# Patient Record
Sex: FEMALE | Race: WHITE | NOT HISPANIC OR LATINO | URBAN - NONMETROPOLITAN AREA
[De-identification: names, ages, dates, MRNs, and addresses within clinical notes are randomized per-mention and may not be internally consistent; named-entity substitution may affect disease eponyms.]

---

## 2019-08-02 ENCOUNTER — OFFICE VISIT (OUTPATIENT)
Dept: PSYCHIATRY | Facility: CLINIC | Age: 34
End: 2019-08-02

## 2019-08-02 DIAGNOSIS — F43.23 ADJUSTMENT DISORDER WITH MIXED ANXIETY AND DEPRESSED MOOD: Primary | ICD-10-CM

## 2019-08-02 PROCEDURE — 90791 PSYCH DIAGNOSTIC EVALUATION: CPT | Performed by: COUNSELOR

## 2019-08-02 NOTE — PROGRESS NOTES
PROGRESS NOTE  Data:  Luz Covarrubias came in 2019 for her regularly scheduled therapy session starting at 8:00 am and ending at 9:15 am, with Ángela Tilley [facility].     (Scales based on 0 - 10 with 10 being the worst)  Depression: {NUMBERS 0-10:06683} Anxiety: {NUMBERS 0-10:29641}     HPI:       Clinical Maneuvering/Intervention:  Assisted patient in processing above session content; acknowledged and normalized patient’s thoughts, feelings, and concerns.     Allowed patient to freely discuss issues without interruption or judgment. Provided safe, confidential environment to facilitate the development of positive therapeutic relationship and encourage open, honest communication. Assisted patient in identifying risk factors which would indicate the need for higher level of care including thoughts to harm self or others and/or self-harming behavior and encouraged patient to contact this office, call 911, or present to the nearest emergency room should any of these events occur. Discussed crisis intervention services and means to access.  Patient adamantly and convincingly denies current suicidal or homicidal ideation or perceptual disturbance.        Assessment     ***    Diagnosis: No diagnosis found.    No primary diagnosis found.    Mental Status Exam  Hygiene:  {GOOD FAIR POOR ENDO NH:02866}  Dress:  {kevin's dress:96686}  Attitude:  {Cooperation:965347948}  Motor Activity:  {Psychomotor Behavior:16441}  Speech:  {Speech:817575668}  Mood:  {MOOD:02818}  Affect:  {AFFECTS:80209}  Thought Processes:  {Thought Progess:144108305}  Thought Content:  {Exam; psych thought content:02485}  Suicidal Thoughts:  {has/denies:615419}  Homicidal Thoughts:  {has/denies:250728}  Crisis Safety Plan: yes, to come to the emergency room.  Hallucinations:  {Actions; denies/admits to:5300}          Patient's Support Network Includes:  {family members:}    Progress toward goal: {tnpt}    Functional Status:  {rsfunctionalstatus:18649}    Prognosis: {tnprognosis:94787}      Plan         Patient will adhere to medication regimen as prescribed and report any side effects. Patient will contact this office, call 911 or present to the nearest emergency room should suicidal or homicidal ideations occur. Provide Cognitive Behavioral Therapy and Integrative Therapy to improve functioning, maintain stability, and avoid decompensation and the need for higher level of care.            No Follow-up on file.            This document electronically signed by Ángela Tilley, CAYLA, NCC, CSAT  August 2, 2019 9:30 AM    Plan
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
## 2019-10-08 ENCOUNTER — OFFICE VISIT (OUTPATIENT)
Dept: PSYCHIATRY | Facility: CLINIC | Age: 34
End: 2019-10-08

## 2019-10-08 DIAGNOSIS — F43.23 ADJUSTMENT DISORDER WITH MIXED ANXIETY AND DEPRESSED MOOD: Primary | ICD-10-CM

## 2019-10-08 PROCEDURE — 90837 PSYTX W PT 60 MINUTES: CPT | Performed by: COUNSELOR

## 2019-10-08 NOTE — PROGRESS NOTES
"    PROGRESS NOTE  Data:  Luz Silverman came in 10/9/2019 for her regularly scheduled therapy session starting at 2:15 pm and ending at 3:15 pm, with Ángela Tilley [facility]. \"I've been really well.\"     (Scales based on 0 - 10 with 10 being the worst)  Depression: 0 Anxiety: 0     HPI:   Patient was pleased \"to share my progression since I was here.\"  Patient has been reading Timo Vora' book \"Feeling Good\" and becoming more aware of irrational thinking patterns and restating negative messages. From the last session she learned that most problems are due to relationship issues.  \"I realized that some of my issues were about my . He is good if I avoid issues but if I call him on it he can acknowledged and will stop up and talk.\"  \"I've become more conscious of my attachment and recognize the need to be with others as well.\"  Patient went with her 25 y/o step daughter to a wellness weekend that focused on meditation, letting go, not judging self and \"accepting as the way out.\"  \"It was wonderful for both of us.\"  \"I recognized how easily it has been for me to spiral.  I felt empowered, found more self-worth, sticking up more for myself.  I've learned to assert myself where previously I had to push myself.  I already was on to the fact I can't manipulate the external.  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  \"I recognize that I can't control my environment but only my response to it.  Before I felt like work was sucking life out of me.\"  Patient rated the quality of her connection with her  now as 8/10 improved my more quality time alone together.  Since April they have only gotten away alone for one weekend in August. \"Previously we felt so disconnected.\"  Her  may soon be able to work from home.  " "Patient recalled how she and her sister had always felt they were \"an imposition in our environment.\"  \"My  has been good at validating and I've been helping him where he struggled.\"    Clinical Maneuvering/Intervention:  Assisted patient in processing above session content; acknowledged, normalized and validated patient’s thoughts, feelings, and concerns.  Patient was affirmed by her progress and encouraged to continue practicing her improved coping skills.    Allowed patient to freely discuss issues without interruption or judgment. Provided safe, confidential environment to facilitate the development of positive therapeutic relationship and encourage open, honest communication. Assisted patient in identifying risk factors which would indicate the need for higher level of care including thoughts to harm self or others and/or self-harming behavior and encouraged patient to contact this office, call 911, or present to the nearest emergency room should any of these events occur. Discussed crisis intervention services and means to access.  Patient adamantly and convincingly denies current suicidal or homicidal ideation or perceptual disturbance.        Assessment     Patient is stable and enjoying progress in her mental health and relationships.    Diagnosis:   Encounter Diagnosis   Name Primary?   • Adjustment disorder with mixed anxiety and depressed mood Yes       Adjustment disorder with mixed anxiety and depressed mood [F43.23]    Mental Status Exam  Hygiene:  good  Dress:  casual  Attitude:  Cooperative  Motor Activity:  Appropriate  Speech:  Normal  Mood:  within normal limits  Affect:  calm and pleasant  Thought Processes:  Goal directed  Thought Content:  normal  Suicidal Thoughts:  denies  Homicidal Thoughts:  denies  Crisis Safety Plan: yes, to come to the emergency room.  Hallucinations:  denies          Patient's Support Network Includes:  , son and extended family and friends    Progress " toward goal: At goal    Functional Status: No impairment    Prognosis: Good with Ongoing Treatment       Plan         Patient will adhere to medication regimen as prescribed and report any side effects. Patient will contact this office, call 911 or present to the nearest emergency room should suicidal or homicidal ideations occur. Provide Cognitive Behavioral Therapy and Integrative Therapy to improve functioning, maintain stability, and avoid decompensation and the need for higher level of care.            Return in about 4 weeks (around 11/5/2019).            This document electronically signed by Ángela Tilley, CAYLA, NCC, CSAT  October 9, 2019 3:25 PM    Plan

## 2020-01-08 ENCOUNTER — TRANSCRIBE ORDERS (OUTPATIENT)
Dept: ADMINISTRATIVE | Facility: HOSPITAL | Age: 35
End: 2020-01-08

## 2020-01-08 ENCOUNTER — LAB (OUTPATIENT)
Dept: LAB | Facility: HOSPITAL | Age: 35
End: 2020-01-08

## 2020-01-08 DIAGNOSIS — T80.51XS: ICD-10-CM

## 2020-01-08 DIAGNOSIS — T80.51XS: Primary | ICD-10-CM

## 2020-01-08 PROCEDURE — 86003 ALLG SPEC IGE CRUDE XTRC EA: CPT

## 2020-01-08 PROCEDURE — 36415 COLL VENOUS BLD VENIPUNCTURE: CPT

## 2020-01-10 LAB
CODFISH IGE QN: <0.1 KU/L
CONV CLASS DESCRIPTION: NORMAL
HALIBUT IGE QN: <0.1 KU/L
MACKEREL IGE QN: <0.1 KU/L
SALMON IGE QN: <0.1 KU/L
TROUT IGE QN: <0.1 KU/L
TUNA IGE QN: <0.1 KU/L
WALLEYE PIKE IGE QN: <0.1 KU/L

## 2020-01-14 ENCOUNTER — APPOINTMENT (OUTPATIENT)
Dept: LAB | Facility: HOSPITAL | Age: 35
End: 2020-01-14

## 2020-01-14 ENCOUNTER — TRANSCRIBE ORDERS (OUTPATIENT)
Dept: ADMINISTRATIVE | Facility: HOSPITAL | Age: 35
End: 2020-01-14

## 2020-01-14 DIAGNOSIS — Z01.89 PATIENT REQUEST FOR DIAGNOSTIC TESTING: Primary | ICD-10-CM

## 2020-01-14 PROCEDURE — 86003 ALLG SPEC IGE CRUDE XTRC EA: CPT | Performed by: NURSE PRACTITIONER

## 2020-01-14 PROCEDURE — 36415 COLL VENOUS BLD VENIPUNCTURE: CPT | Performed by: NURSE PRACTITIONER

## 2020-01-18 LAB
BLUE MUSSEL IGE QN: <0.1 KU/L
CLAM IGE QN: <0.1 KU/L
CONV CLASS DESCRIPTION: ABNORMAL
CRAB IGE QN: <0.1 KU/L
LOBSTER IGE QN: <0.1 KU/L
OYSTER IGE QN: <0.1 KU/L
SCALLOP IGE QN: <0.1 KU/L
SHRIMP IGE: 0.95 KU/L

## 2020-08-03 ENCOUNTER — OFFICE VISIT (OUTPATIENT)
Dept: FAMILY MEDICINE CLINIC | Facility: CLINIC | Age: 35
End: 2020-08-03

## 2020-08-03 VITALS
HEART RATE: 88 BPM | SYSTOLIC BLOOD PRESSURE: 118 MMHG | WEIGHT: 151 LBS | TEMPERATURE: 98.4 F | DIASTOLIC BLOOD PRESSURE: 76 MMHG | HEIGHT: 66 IN | OXYGEN SATURATION: 99 % | BODY MASS INDEX: 24.27 KG/M2

## 2020-08-03 DIAGNOSIS — K58.0 IRRITABLE BOWEL SYNDROME WITH DIARRHEA: ICD-10-CM

## 2020-08-03 DIAGNOSIS — R53.83 FATIGUE, UNSPECIFIED TYPE: ICD-10-CM

## 2020-08-03 DIAGNOSIS — K90.41 GLUTEN INTOLERANCE: ICD-10-CM

## 2020-08-03 DIAGNOSIS — F33.1 MODERATE EPISODE OF RECURRENT MAJOR DEPRESSIVE DISORDER (HCC): Primary | ICD-10-CM

## 2020-08-03 PROCEDURE — 86431 RHEUMATOID FACTOR QUANT: CPT | Performed by: FAMILY MEDICINE

## 2020-08-03 PROCEDURE — 84443 ASSAY THYROID STIM HORMONE: CPT | Performed by: FAMILY MEDICINE

## 2020-08-03 PROCEDURE — 99203 OFFICE O/P NEW LOW 30 MIN: CPT | Performed by: FAMILY MEDICINE

## 2020-08-03 PROCEDURE — 85652 RBC SED RATE AUTOMATED: CPT | Performed by: FAMILY MEDICINE

## 2020-08-03 PROCEDURE — 82728 ASSAY OF FERRITIN: CPT | Performed by: FAMILY MEDICINE

## 2020-08-03 PROCEDURE — 82306 VITAMIN D 25 HYDROXY: CPT | Performed by: FAMILY MEDICINE

## 2020-08-03 PROCEDURE — 86038 ANTINUCLEAR ANTIBODIES: CPT | Performed by: FAMILY MEDICINE

## 2020-08-03 PROCEDURE — 86200 CCP ANTIBODY: CPT | Performed by: FAMILY MEDICINE

## 2020-08-03 PROCEDURE — 82746 ASSAY OF FOLIC ACID SERUM: CPT | Performed by: FAMILY MEDICINE

## 2020-08-03 PROCEDURE — 80053 COMPREHEN METABOLIC PANEL: CPT | Performed by: FAMILY MEDICINE

## 2020-08-03 PROCEDURE — 83540 ASSAY OF IRON: CPT | Performed by: FAMILY MEDICINE

## 2020-08-03 PROCEDURE — 84466 ASSAY OF TRANSFERRIN: CPT | Performed by: FAMILY MEDICINE

## 2020-08-03 PROCEDURE — 83735 ASSAY OF MAGNESIUM: CPT | Performed by: FAMILY MEDICINE

## 2020-08-03 PROCEDURE — 85025 COMPLETE CBC W/AUTO DIFF WBC: CPT | Performed by: FAMILY MEDICINE

## 2020-08-03 PROCEDURE — 82607 VITAMIN B-12: CPT | Performed by: FAMILY MEDICINE

## 2020-08-03 RX ORDER — IPRATROPIUM BROMIDE 21 UG/1
2 SPRAY, METERED NASAL EVERY 12 HOURS
COMMUNITY
End: 2020-08-31 | Stop reason: SDUPTHER

## 2020-08-03 RX ORDER — BUPROPION HYDROCHLORIDE 150 MG/1
150 TABLET ORAL EVERY MORNING
Qty: 30 TABLET | Refills: 1 | Status: SHIPPED | OUTPATIENT
Start: 2020-08-03 | End: 2020-08-31 | Stop reason: SDUPTHER

## 2020-08-03 NOTE — PROGRESS NOTES
Subjective   Luz Silverman is a 35 y.o. female.   Pt presents today with CC of Irritable Bowel Syndrome      History of Present Illness   Patient is a pleasant 35-year-old female here to establish care.  She has a 13-year-old son.  She works full-time at [employer].  #2 she has a history of suicide in her family x2.  She had depression last fall for which she saw counselor.  She did not have a great relationship with this counselor and requests a new counselor.  She has never been on medications, reportedly.  She is interested in starting a medication.  She reports that she is sleeping well, she is lost a little interest in things she likes to do, she is not guilty about anything though does admit that her family members' suicides have taken a toll on her.  She also reports marital concerns.  She reports good energy, she tries to exercise regularly.  Her appetite has been normal.  She denies suicidal or homicidal ideation.  #3 she complains of chronic fatigue.  She would like laboratory work-up for this.  She is concerned about autoimmune causes, she would like screening for this.  #4 she was diagnosed with IBS with diarrhea in the past by gastroenterology.  She requests a second opinion.  Also, she has been told she has a gluten intolerance.  Historically, when she eats gluten she does become ill.       The following portions of the patient's history were reviewed and updated as appropriate: allergies, current medications, past family history, past medical history, past social history, past surgical history and problem list.    Review of Systems   Constitutional: Negative for chills, fever and unexpected weight loss.   HENT: Negative for congestion and sore throat.    Eyes: Negative for blurred vision and visual disturbance.   Respiratory: Negative for cough and wheezing.    Cardiovascular: Negative for chest pain and palpitations.   Gastrointestinal: Negative for abdominal pain and diarrhea.   Genitourinary:  Negative for dysuria.   Musculoskeletal: Negative for arthralgias and neck stiffness.   Skin: Negative for rash.   Neurological: Negative for dizziness, seizures and syncope.   Psychiatric/Behavioral: Negative for self-injury, suicidal ideas and depressed mood.       Objective   Physical Exam   Constitutional: She is oriented to person, place, and time. She appears well-developed and well-nourished.   HENT:   Head: Normocephalic and atraumatic.   Right Ear: External ear normal.   Left Ear: External ear normal.   Nose: Nose normal.   Mouth/Throat: Oropharynx is clear and moist.   Eyes: Pupils are equal, round, and reactive to light. Conjunctivae and EOM are normal.   Neck: Normal range of motion. Neck supple.   Cardiovascular: Normal rate, regular rhythm and normal heart sounds.   Pulmonary/Chest: Effort normal and breath sounds normal.   Abdominal: Soft. Bowel sounds are normal.   Neurological: She is alert and oriented to person, place, and time.   Skin: Skin is warm and dry.   Nursing note and vitals reviewed.        Assessment/Plan   Luz was seen today for irritable bowel syndrome.    Diagnoses and all orders for this visit:    Moderate episode of recurrent major depressive disorder (CMS/HCC)  -     buPROPion XL (Wellbutrin XL) 150 MG 24 hr tablet; Take 1 tablet by mouth Every Morning.  -     Psychotherapy; Future  We will refer her to another therapist, we discussed medication options.  Because of common GI side effects of SSRIs, we will start Wellbutrin.  The side effects of this medication were discussed and she was agreeable to proceed.  If she develops suicidal or homicidal ideation, she was encouraged to go to the emergency room.  Fatigue, unspecified type  -     CBC Auto Differential; Future  -     Comprehensive Metabolic Panel; Future  -     Sedimentation Rate; Future  -     Cyclic Citrul Peptide Antibody, IgG / IgA; Future  -     Ferritin; Future  -     Folate; Future  -     Iron Profile; Future  -      Magnesium; Future  -     Vitamin D 25 Hydroxy; Future  -     Vitamin B12; Future  -     TSH; Future  -     Rheumatoid Factor; Future  -     TODD; Future  -     CBC Auto Differential  -     Comprehensive Metabolic Panel  -     Sedimentation Rate  -     Cyclic Citrul Peptide Antibody, IgG / IgA  -     Ferritin  -     Folate  -     Iron Profile  -     Magnesium  -     Vitamin D 25 Hydroxy  -     Vitamin B12  -     TSH  -     Rheumatoid Factor  -     TODD  We will get labs to look into reasons for fatigue.  I suspect that most of her fatigue is from depression associated with being a mother and wife along with working full-time.  I encouraged her to try and exercise regularly.  She is agreeable.  Irritable bowel syndrome with diarrhea  -     Ambulatory Referral to Gastroenterology    Gluten intolerance  -     Ambulatory Referral to Gastroenterology                    Patient's Body mass index is 24.37 kg/m². BMI is above normal parameters. Recommendations include: exercise counseling and nutrition counseling.

## 2020-08-04 LAB
25(OH)D3 SERPL-MCNC: 42.4 NG/ML (ref 30–100)
ALBUMIN SERPL-MCNC: 4.4 G/DL (ref 3.5–5.2)
ALBUMIN/GLOB SERPL: 1.5 G/DL
ALP SERPL-CCNC: 45 U/L (ref 39–117)
ALT SERPL W P-5'-P-CCNC: 16 U/L (ref 1–33)
ANION GAP SERPL CALCULATED.3IONS-SCNC: 9.7 MMOL/L (ref 5–15)
AST SERPL-CCNC: 23 U/L (ref 1–32)
BASOPHILS # BLD AUTO: 0.04 10*3/MM3 (ref 0–0.2)
BASOPHILS NFR BLD AUTO: 0.6 % (ref 0–1.5)
BILIRUB SERPL-MCNC: 0.2 MG/DL (ref 0–1.2)
BUN SERPL-MCNC: 10 MG/DL (ref 6–20)
BUN/CREAT SERPL: 15.2 (ref 7–25)
CALCIUM SPEC-SCNC: 9.6 MG/DL (ref 8.6–10.5)
CHLORIDE SERPL-SCNC: 101 MMOL/L (ref 98–107)
CHROMATIN AB SERPL-ACNC: 14.8 IU/ML (ref 0–14)
CO2 SERPL-SCNC: 26.3 MMOL/L (ref 22–29)
CREAT SERPL-MCNC: 0.66 MG/DL (ref 0.57–1)
DEPRECATED RDW RBC AUTO: 41.8 FL (ref 37–54)
EOSINOPHIL # BLD AUTO: 0.09 10*3/MM3 (ref 0–0.4)
EOSINOPHIL NFR BLD AUTO: 1.3 % (ref 0.3–6.2)
ERYTHROCYTE [DISTWIDTH] IN BLOOD BY AUTOMATED COUNT: 12.1 % (ref 12.3–15.4)
ERYTHROCYTE [SEDIMENTATION RATE] IN BLOOD: 7 MM/HR (ref 0–20)
FERRITIN SERPL-MCNC: 50.4 NG/ML (ref 13–150)
FOLATE SERPL-MCNC: 5.79 NG/ML (ref 4.78–24.2)
GFR SERPL CREATININE-BSD FRML MDRD: 101 ML/MIN/1.73
GLOBULIN UR ELPH-MCNC: 3 GM/DL
GLUCOSE SERPL-MCNC: 92 MG/DL (ref 65–99)
HCT VFR BLD AUTO: 41.9 % (ref 34–46.6)
HGB BLD-MCNC: 14.5 G/DL (ref 12–15.9)
IMM GRANULOCYTES # BLD AUTO: 0.02 10*3/MM3 (ref 0–0.05)
IMM GRANULOCYTES NFR BLD AUTO: 0.3 % (ref 0–0.5)
IRON 24H UR-MRATE: 121 MCG/DL (ref 37–145)
IRON SATN MFR SERPL: 29 % (ref 20–50)
LYMPHOCYTES # BLD AUTO: 1.82 10*3/MM3 (ref 0.7–3.1)
LYMPHOCYTES NFR BLD AUTO: 26.5 % (ref 19.6–45.3)
MAGNESIUM SERPL-MCNC: 2.1 MG/DL (ref 1.6–2.6)
MCH RBC QN AUTO: 32.2 PG (ref 26.6–33)
MCHC RBC AUTO-ENTMCNC: 34.6 G/DL (ref 31.5–35.7)
MCV RBC AUTO: 92.9 FL (ref 79–97)
MONOCYTES # BLD AUTO: 0.38 10*3/MM3 (ref 0.1–0.9)
MONOCYTES NFR BLD AUTO: 5.5 % (ref 5–12)
NEUTROPHILS NFR BLD AUTO: 4.53 10*3/MM3 (ref 1.7–7)
NEUTROPHILS NFR BLD AUTO: 65.8 % (ref 42.7–76)
NRBC BLD AUTO-RTO: 0 /100 WBC (ref 0–0.2)
PLATELET # BLD AUTO: 286 10*3/MM3 (ref 140–450)
PMV BLD AUTO: 10.7 FL (ref 6–12)
POTASSIUM SERPL-SCNC: 3.6 MMOL/L (ref 3.5–5.2)
PROT SERPL-MCNC: 7.4 G/DL (ref 6–8.5)
RBC # BLD AUTO: 4.51 10*6/MM3 (ref 3.77–5.28)
SODIUM SERPL-SCNC: 137 MMOL/L (ref 136–145)
TIBC SERPL-MCNC: 419 MCG/DL (ref 298–536)
TRANSFERRIN SERPL-MCNC: 281 MG/DL (ref 200–360)
TSH SERPL DL<=0.05 MIU/L-ACNC: 1.21 UIU/ML (ref 0.27–4.2)
VIT B12 BLD-MCNC: 502 PG/ML (ref 211–946)
WBC # BLD AUTO: 6.88 10*3/MM3 (ref 3.4–10.8)

## 2020-08-05 LAB — ANA SER QL: NEGATIVE

## 2020-08-06 ENCOUNTER — TELEPHONE (OUTPATIENT)
Dept: FAMILY MEDICINE CLINIC | Facility: CLINIC | Age: 35
End: 2020-08-06

## 2020-08-06 DIAGNOSIS — M25.541 ARTHRALGIA OF BOTH HANDS: Primary | ICD-10-CM

## 2020-08-06 DIAGNOSIS — M25.542 ARTHRALGIA OF BOTH HANDS: Primary | ICD-10-CM

## 2020-08-06 LAB — CCP IGA+IGG SERPL IA-ACNC: 4 UNITS (ref 0–19)

## 2020-08-06 NOTE — TELEPHONE ENCOUNTER
----- Message from Adama Yoder DO sent at 8/6/2020  2:43 PM EDT -----  I reviewed your labs.  Everything looks good with the exception of your rheumatoid factor, this is 1 of the multiple labs that we checked and screening for rheumatologic disorders.  It came back slightly abnormal.  The remainder of your labs were normal.  If you would like, we could refer you to rheumatology, or we could consider rechecking at a follow-up along with further lab work.  This one lab alone does not mean that you have rheumatoid arthritis, though it does increase suspicion.      Spoke with patient & she chooses to go ahead & see Rheumatology.

## 2020-08-31 ENCOUNTER — OFFICE VISIT (OUTPATIENT)
Dept: FAMILY MEDICINE CLINIC | Facility: CLINIC | Age: 35
End: 2020-08-31

## 2020-08-31 VITALS
OXYGEN SATURATION: 99 % | TEMPERATURE: 97.1 F | DIASTOLIC BLOOD PRESSURE: 76 MMHG | SYSTOLIC BLOOD PRESSURE: 122 MMHG | BODY MASS INDEX: 24.08 KG/M2 | WEIGHT: 149.8 LBS | HEART RATE: 105 BPM | HEIGHT: 66 IN

## 2020-08-31 DIAGNOSIS — F33.1 MODERATE EPISODE OF RECURRENT MAJOR DEPRESSIVE DISORDER (HCC): ICD-10-CM

## 2020-08-31 PROCEDURE — 99213 OFFICE O/P EST LOW 20 MIN: CPT | Performed by: FAMILY MEDICINE

## 2020-08-31 RX ORDER — IPRATROPIUM BROMIDE 21 UG/1
2 SPRAY, METERED NASAL EVERY 12 HOURS
Qty: 30 ML | Refills: 3 | Status: SHIPPED | OUTPATIENT
Start: 2020-08-31 | End: 2021-03-17

## 2020-08-31 RX ORDER — BUPROPION HYDROCHLORIDE 150 MG/1
150 TABLET ORAL EVERY MORNING
Qty: 90 TABLET | Refills: 3 | Status: SHIPPED | OUTPATIENT
Start: 2020-08-31

## 2020-08-31 NOTE — PROGRESS NOTES
Subjective   Luz Silverman is a 35 y.o. female.   Pt presents today with CC of Depression      History of Present Illness   Patient is a pleasant 35-year-old female here to follow-up on depression.  She was started on Wellbutrin  mg daily.  She reports this medication works well.  She has had no problems.  She has had no foggy headedness.  She denies depression.  She denies any suicidal or homicidal ideation.  No palpitations, chest pain, no other concerns.  She would like to continue this medication same dose.       The following portions of the patient's history were reviewed and updated as appropriate: allergies, current medications, past family history, past medical history, past social history, past surgical history and problem list.    Review of Systems   Constitutional: Negative for chills, fever and unexpected weight loss.   HENT: Negative for congestion and sore throat.    Eyes: Negative for blurred vision and visual disturbance.   Respiratory: Negative for cough and wheezing.    Cardiovascular: Negative for chest pain and palpitations.   Gastrointestinal: Negative for abdominal pain and diarrhea.   Endocrine: Negative for cold intolerance and heat intolerance.   Genitourinary: Negative for dysuria.   Musculoskeletal: Negative for arthralgias and neck stiffness.   Neurological: Negative for dizziness, seizures and syncope.   Psychiatric/Behavioral: Negative for self-injury, suicidal ideas and depressed mood.       Objective   Physical Exam   Constitutional: She appears well-developed and well-nourished.   HENT:   Head: Normocephalic and atraumatic.   Eyes: Conjunctivae are normal.   Neck: Normal range of motion. Neck supple. No thyromegaly present.   Cardiovascular: Normal rate and regular rhythm.   Pulmonary/Chest: Effort normal and breath sounds normal.   Nursing note and vitals reviewed.        Assessment/Plan   Luz was seen today for depression.    Diagnoses and all orders for this  visit:    Moderate episode of recurrent major depressive disorder (CMS/HCC)  -     buPROPion XL (Wellbutrin XL) 150 MG 24 hr tablet; Take 1 tablet by mouth Every Morning.  We will continue with the current dose.  As she has been stable on it, no need for close follow-up.  The side effects of this medication were reviewed and she was agreeable to proceed.  If she has any problems she is can follow-up.  Otherwise, follow-up in 1 year for annual physical.    Other orders  -     ipratropium (ATROVENT) 0.03 % nasal spray; 2 sprays into the nostril(s) as directed by provider Every 12 (Twelve) Hours.      Of note, she will be establishing care with a local women's health clinic.  Her last Pap smear was 2 years ago.           Patient's Body mass index is 24.19 kg/m². BMI is above normal parameters. Recommendations include: exercise counseling and nutrition counseling.

## 2020-09-03 ENCOUNTER — OFFICE VISIT (OUTPATIENT)
Dept: GASTROENTEROLOGY | Facility: CLINIC | Age: 35
End: 2020-09-03

## 2020-09-03 VITALS
HEART RATE: 94 BPM | SYSTOLIC BLOOD PRESSURE: 144 MMHG | WEIGHT: 149 LBS | TEMPERATURE: 97 F | BODY MASS INDEX: 24.06 KG/M2 | DIASTOLIC BLOOD PRESSURE: 91 MMHG

## 2020-09-03 DIAGNOSIS — E53.8 VITAMIN B12 DEFICIENCY: ICD-10-CM

## 2020-09-03 DIAGNOSIS — R10.9 ABDOMINAL PAIN, UNSPECIFIED ABDOMINAL LOCATION: Primary | ICD-10-CM

## 2020-09-03 DIAGNOSIS — R19.7 DIARRHEA, UNSPECIFIED TYPE: ICD-10-CM

## 2020-09-03 PROCEDURE — 99244 OFF/OP CNSLTJ NEW/EST MOD 40: CPT | Performed by: INTERNAL MEDICINE

## 2020-09-03 NOTE — PROGRESS NOTES
PCP:  Adama Yoder,      No referring provider defined for this encounter.    Chief Complaint   Patient presents with   • Irritable Bowel Syndrome     New pt. IBS        HPI   Patient is a 35-year-old female who 13 years ago had a  section.  That is when her problems began.  She noted that at that time she became lactose intolerant.  She also has felt that she has become gluten intolerant.  Her previous serologies have been negative.  She has been on a gluten-free diet for a few years now.  There is no family history of ulcerative colitis or Crohn's disease.  She has significant symptoms with diarrhea and abdominal discomfort if she takes gluten.  Her hemoglobin was 14.5 and hematocrit 41.9.  Her white blood cell count was 6.88 and platelet count 286.  Her liver chemistries were normal.  These were done on 8/3/2020.  She has no family history of colon polyps or cancers.  She interestingly also has B12 deficiency.  She notes some fogginess of her head.  She does get canker sores in her mouth.  She has had a  section,    No Known Allergies       Current Outpatient Medications:   •  buPROPion XL (Wellbutrin XL) 150 MG 24 hr tablet, Take 1 tablet by mouth Every Morning., Disp: 90 tablet, Rfl: 3  •  Cromolyn Sodium (FP NASAL ALLERGY NA), into the nostril(s) as directed by provider Daily., Disp: , Rfl:   •  ipratropium (ATROVENT) 0.03 % nasal spray, 2 sprays into the nostril(s) as directed by provider Every 12 (Twelve) Hours., Disp: 30 mL, Rfl: 3  •  Norethin-Eth Estrad-Fe Biphas (LO LOESTRIN FE PO), Take  by mouth Daily., Disp: , Rfl:      Past Medical History:   Diagnosis Date   • IBS (irritable bowel syndrome)        Past Surgical History:   Procedure Laterality Date   •  SECTION     • MOUTH SURGERY     Breast augmentation  Cervical biopsy    Social History     Socioeconomic History   • Marital status:      Spouse name: Not on file   • Number of children: Not on file   • Years of  education: Not on file   • Highest education level: Not on file   Tobacco Use   • Smoking status:  Non-smoker quit in her late 20s   • Smokeless tobacco: Never Used   Substance and Sexual Activity   • Alcohol use: Yes     Frequency: Monthly or less   • Drug use: Never   • Sexual activity: Yes     Partners: Male     Birth control/protection: Pill        Family History   Problem Relation Age of Onset   • Thyroid disease Mother    • Fibromyalgia Mother    • Depression Mother         Review of Systems   Constitutional: Positive for fatigue.   HENT: Negative.    Eyes: Negative.    Respiratory: Negative.    Gastrointestinal: Positive for abdominal distention and diarrhea.   Endocrine: Negative.    Musculoskeletal: Negative.    Allergic/Immunologic: Negative.    Neurological: Negative.    Hematological: Negative.    Psychiatric/Behavioral: Negative.         Vitals:    09/03/20 1352   BP: 144/91   Pulse: 94   Temp: 97 °F (36.1 °C)        Physical Exam   General Appearance: Alert, in no acute distress   Head: Normocephalic, without obvious abnormality, atraumatic   Eyes: Lids and lashes normal, conjunctivae and sclerae normal, no icterus, no pallor, corneas clear, PERRLA   Ears: Ears appear intact with no abnormalities noted   Neck: No adenopathy, supple, trachea midline, no thyromegaly, no JVD   Lungs: respirations regular, even and unlabored Heart: Regular rhythm and normal rate   Chest Wall: Symmetrical respiratory expansion   Abdomen: No masses, no organomegaly, soft non-tender, non-distended, no guarding, no rebound tenderness   Extremities: Moves all extremities well, no edema, no cyanosis, no redness   Skin: No bleeding, bruising or rash   Neurologic: Cranial nerves 2 - 12 grossly intact, no focal deficits     Review of systems was reviewed and positives are noted. All of the remaining review of systems in that system are negative.    Luz was seen today for irritable bowel syndrome.    Diagnoses and all orders for  this visit:    Abdominal pain, unspecified abdominal location  -     Celiac Comprehensive Panel    Diarrhea, unspecified type    Vitamin B12 deficiency    Impressions and plan #1 abdominal discomfort and diarrhea: She is wondering if she has celiac disease.  Her previous serologies were negative but I do not have those results.  It is unclear whether she had a comprehensive celiac panel or not.  She has been on a fairly strict gluten-free diet for the past few years.  80 to 90% of patients that have been on a strict gluten-free diet will normalize there antibody levels with time.  We can check a comprehensive celiac panel and if it is elevated it is helpful, although if it is normal it is not as helpful.  We will check a genetic marker study as well.  If that genetic study says that celiac disease is not likely that is helpful.  It may well show that celiac disease is a possibility in which case it is not too helpful.  A gluten challenge may need to be done if she would like to know.  We usually challenge for at least 2 weeks.  If patients can tolerate it we challenge as long as 6 weeks.  We then perform an upper endoscopy.  She has not had an upper endoscopy.  She also has B12 deficiency.  She has canker sores as well.  The possibility of Crohn's disease is always there as well and I discussed that with her.  My guess is she does not have Crohn's disease but certainly with B12 deficiency it is always a concern as B12 is absorbed in the terminal ileum and can be affected and Crohn's disease not infrequently.  We will see her back after we get the results of her testing.    Paresh Drake MD

## 2020-09-04 PROBLEM — R10.9 ABDOMINAL PAIN: Status: ACTIVE | Noted: 2020-09-04

## 2020-09-04 PROBLEM — E53.8 VITAMIN B12 DEFICIENCY: Status: ACTIVE | Noted: 2020-09-04

## 2020-09-04 PROBLEM — R19.7 DIARRHEA: Status: ACTIVE | Noted: 2020-09-04

## 2020-09-09 ENCOUNTER — LAB (OUTPATIENT)
Dept: LAB | Facility: HOSPITAL | Age: 35
End: 2020-09-09

## 2020-09-09 PROCEDURE — 83516 IMMUNOASSAY NONANTIBODY: CPT | Performed by: INTERNAL MEDICINE

## 2020-09-09 PROCEDURE — 82784 ASSAY IGA/IGD/IGG/IGM EACH: CPT | Performed by: INTERNAL MEDICINE

## 2020-09-09 PROCEDURE — 36415 COLL VENOUS BLD VENIPUNCTURE: CPT | Performed by: INTERNAL MEDICINE

## 2020-09-09 PROCEDURE — 86255 FLUORESCENT ANTIBODY SCREEN: CPT | Performed by: INTERNAL MEDICINE

## 2020-09-10 LAB
ENDOMYSIUM IGA SER QL: NEGATIVE
GLIADIN PEPTIDE IGA SER-ACNC: 5 UNITS (ref 0–19)
GLIADIN PEPTIDE IGG SER-ACNC: 3 UNITS (ref 0–19)
IGA SERPL-MCNC: 184 MG/DL (ref 87–352)
TTG IGA SER-ACNC: 2 U/ML (ref 0–3)
TTG IGG SER-ACNC: 22 U/ML (ref 0–5)

## 2020-09-23 ENCOUNTER — TELEPHONE (OUTPATIENT)
Dept: GASTROENTEROLOGY | Facility: CLINIC | Age: 35
End: 2020-09-23

## 2020-09-23 DIAGNOSIS — R10.9 ABDOMINAL PAIN, UNSPECIFIED ABDOMINAL LOCATION: Primary | ICD-10-CM

## 2020-09-23 DIAGNOSIS — E53.8 VITAMIN B12 DEFICIENCY: ICD-10-CM

## 2020-09-23 DIAGNOSIS — R19.7 DIARRHEA, UNSPECIFIED TYPE: ICD-10-CM

## 2020-09-23 NOTE — TELEPHONE ENCOUNTER
Patient was supposed to have a celiac panel and a [employer] genetic test but only the celiac panel was done.  We are going to enter an order for that test and the patient will go have it redrawn.

## 2020-09-26 ENCOUNTER — LAB (OUTPATIENT)
Dept: LAB | Facility: HOSPITAL | Age: 35
End: 2020-09-26

## 2020-09-26 DIAGNOSIS — R10.9 ABDOMINAL PAIN, UNSPECIFIED ABDOMINAL LOCATION: ICD-10-CM

## 2020-09-26 DIAGNOSIS — R19.7 DIARRHEA, UNSPECIFIED TYPE: ICD-10-CM

## 2020-09-26 DIAGNOSIS — E53.8 VITAMIN B12 DEFICIENCY: ICD-10-CM

## 2020-09-26 PROCEDURE — 36415 COLL VENOUS BLD VENIPUNCTURE: CPT

## 2020-09-26 PROCEDURE — 81382 HLA II TYPING 1 LOC HR: CPT

## 2020-10-05 LAB
ANNOTATION COMMENT IMP: NORMAL
HLA-DQ2 QL: NEGATIVE
HLA-DQ8 QL: POSITIVE
REF LAB TEST METHOD: NORMAL

## 2020-10-26 ENCOUNTER — OFFICE VISIT (OUTPATIENT)
Dept: GASTROENTEROLOGY | Facility: CLINIC | Age: 35
End: 2020-10-26

## 2020-10-26 VITALS
WEIGHT: 150 LBS | BODY MASS INDEX: 24.22 KG/M2 | DIASTOLIC BLOOD PRESSURE: 90 MMHG | SYSTOLIC BLOOD PRESSURE: 135 MMHG | HEART RATE: 88 BPM

## 2020-10-26 DIAGNOSIS — R10.9 ABDOMINAL PAIN, UNSPECIFIED ABDOMINAL LOCATION: Primary | ICD-10-CM

## 2020-10-26 DIAGNOSIS — K90.41 GLUTEN INTOLERANCE: ICD-10-CM

## 2020-10-26 PROCEDURE — 99214 OFFICE O/P EST MOD 30 MIN: CPT | Performed by: INTERNAL MEDICINE

## 2020-10-26 RX ORDER — CYANOCOBALAMIN 1000 UG/ML
INJECTION, SOLUTION INTRAMUSCULAR; SUBCUTANEOUS
COMMUNITY
Start: 2020-09-10

## 2020-10-26 NOTE — PROGRESS NOTES
PCP:  Adama Yoder, DO     No referring provider defined for this encounter.    Chief Complaint   Patient presents with   • Follow-up        HPI   The patient is a 35-year-old female with a history of gluten intolerance.  If she gets any gluten she has troubles with diarrhea, gas and abdominal pain.  Her genetic studies do not exclude it largely.  They estimated the risk to be 1: 89.  She has been on a gluten-free diet for years.  Her celiac panel did show an elevated TTG IgG to 22.  Her other levels were normal.  She is not IgA deficient.  She would like to know if she truly has celiac disease.  It is fairly clear that she seems to be gluten intolerant.    No Known Allergies       Current Outpatient Medications:   •  buPROPion XL (Wellbutrin XL) 150 MG 24 hr tablet, Take 1 tablet by mouth Every Morning., Disp: 90 tablet, Rfl: 3  •  Cromolyn Sodium (FP NASAL ALLERGY NA), into the nostril(s) as directed by provider Daily., Disp: , Rfl:   •  cyanocobalamin 1000 MCG/ML injection, INJECT 1ML SC ONCE A WEEK FOR 4 WEEKS THEN 1ML ONCE A MONTH THEREAFTER, Disp: , Rfl:   •  ipratropium (ATROVENT) 0.03 % nasal spray, 2 sprays into the nostril(s) as directed by provider Every 12 (Twelve) Hours., Disp: 30 mL, Rfl: 3  •  Norethin-Eth Estrad-Fe Biphas (LO LOESTRIN FE PO), Take  by mouth Daily., Disp: , Rfl:      Past Medical History:   Diagnosis Date   • IBS (irritable bowel syndrome)        Past Surgical History:   Procedure Laterality Date   •  SECTION     • MOUTH SURGERY          Social History     Socioeconomic History   • Marital status:      Spouse name: Not on file   • Number of children: Not on file   • Years of education: Not on file   • Highest education level: Not on file   Tobacco Use   • Smoking status: Never Smoker   • Smokeless tobacco: Never Used   Substance and Sexual Activity   • Alcohol use: Yes     Frequency: Monthly or less   • Drug use: Never   • Sexual activity: Yes     Partners: Male      Birth control/protection: Pill        Family History   Problem Relation Age of Onset   • Thyroid disease Mother    • Fibromyalgia Mother    • Depression Mother         Review of Systems   Constitutional: Negative.    HENT: Negative for trouble swallowing and voice change.    Gastrointestinal: Negative.         Vitals:    10/26/20 1523   BP: 135/90   Pulse: 88        Physical Exam   General Appearance: Alert, in no acute distress   Head: Normocephalic, without obvious abnormality, atraumatic   Eyes: Lids and lashes normal, conjunctivae and sclerae normal, no icterus, no pallor, corneas clear, PERRLA   Ears: Ears appear intact with no abnormalities noted   Extremities: Moves all extremities well, no edema, no cyanosis, no redness   Skin: No bleeding, bruising or rash   Neurologic: Cranial nerves 2 - 12 grossly intact, no focal deficits     Review of systems was reviewed and positives are noted. All of the remaining review of systems in that system are negative.    Diagnoses and all orders for this visit:    1. Abdominal pain, unspecified abdominal location (Primary)    2. Gluten intolerance    Impressions and plan #1 abdominal pain and gluten intolerance: She certainly has gluten intolerance.  The question is does she have celiac disease.  She certainly may.  Her TTG is positive.  She has been on a gluten-free diet.  These antibodies may be would be much more elevated if she was not on a gluten-free diet.  We talked about a gluten challenge and she is familiar with that.  She is going to try and stay off gluten for a month.  We typically like people to be off at least 2 weeks.  Is probably better to be off as long as 6 weeks.  4 weeks is a compromise.  We will plan an upper endoscopy and biopsy at that time and I will recheck her antibodies.  That should give us an idea of whether or not we have celiac disease.  I would think the antibodies would be increased if she has significant gluten challenge.  She would  also have biopsy changes.    Paresh Drake MD

## 2020-11-30 ENCOUNTER — TRANSCRIBE ORDERS (OUTPATIENT)
Dept: ADMINISTRATIVE | Facility: HOSPITAL | Age: 35
End: 2020-11-30

## 2020-11-30 DIAGNOSIS — Z01.818 OTHER SPECIFIED PRE-OPERATIVE EXAMINATION: Primary | ICD-10-CM

## 2020-12-01 ENCOUNTER — LAB (OUTPATIENT)
Dept: LAB | Facility: HOSPITAL | Age: 35
End: 2020-12-01

## 2020-12-01 DIAGNOSIS — Z01.818 OTHER SPECIFIED PRE-OPERATIVE EXAMINATION: ICD-10-CM

## 2020-12-01 PROCEDURE — U0004 COV-19 TEST NON-CDC HGH THRU: HCPCS | Performed by: INTERNAL MEDICINE

## 2020-12-01 PROCEDURE — C9803 HOPD COVID-19 SPEC COLLECT: HCPCS

## 2020-12-02 LAB — SARS-COV-2 RNA RESP QL NAA+PROBE: NOT DETECTED

## 2020-12-04 ENCOUNTER — OUTSIDE FACILITY SERVICE (OUTPATIENT)
Dept: GASTROENTEROLOGY | Facility: CLINIC | Age: 35
End: 2020-12-04

## 2020-12-04 PROCEDURE — 88305 TISSUE EXAM BY PATHOLOGIST: CPT | Performed by: INTERNAL MEDICINE

## 2020-12-04 PROCEDURE — 43239 EGD BIOPSY SINGLE/MULTIPLE: CPT | Performed by: INTERNAL MEDICINE

## 2020-12-07 ENCOUNTER — LAB REQUISITION (OUTPATIENT)
Dept: LAB | Facility: HOSPITAL | Age: 35
End: 2020-12-07

## 2020-12-07 DIAGNOSIS — K90.0 CELIAC DISEASE: ICD-10-CM

## 2020-12-09 LAB
CYTO UR: NORMAL
LAB AP CASE REPORT: NORMAL
LAB AP CLINICAL INFORMATION: NORMAL
PATH REPORT.FINAL DX SPEC: NORMAL
PATH REPORT.GROSS SPEC: NORMAL

## 2021-03-17 RX ORDER — IPRATROPIUM BROMIDE 21 UG/1
2 SPRAY, METERED NASAL EVERY 12 HOURS
Qty: 30 ML | Refills: 3 | Status: SHIPPED | OUTPATIENT
Start: 2021-03-17

## 2021-03-18 ENCOUNTER — BULK ORDERING (OUTPATIENT)
Dept: CASE MANAGEMENT | Facility: OTHER | Age: 36
End: 2021-03-18

## 2021-03-18 DIAGNOSIS — Z23 IMMUNIZATION DUE: ICD-10-CM

## 2021-07-09 ENCOUNTER — TELEPHONE (OUTPATIENT)
Dept: GASTROENTEROLOGY | Facility: CLINIC | Age: 36
End: 2021-07-09

## 2021-07-12 ENCOUNTER — TELEPHONE (OUTPATIENT)
Dept: GASTROENTEROLOGY | Facility: CLINIC | Age: 36
End: 2021-07-12

## 2021-07-12 NOTE — TELEPHONE ENCOUNTER
Returned call and informed patient that Vitamin B injections would have to be handled by her PCP. Patient stated her understanding and had no further questions.

## 2021-08-12 RX ORDER — IPRATROPIUM BROMIDE 21 UG/1
2 SPRAY, METERED NASAL EVERY 12 HOURS
Refills: 1 | OUTPATIENT
Start: 2021-08-12

## 2021-09-07 ENCOUNTER — LAB (OUTPATIENT)
Dept: LAB | Facility: HOSPITAL | Age: 36
End: 2021-09-07

## 2021-09-07 ENCOUNTER — TRANSCRIBE ORDERS (OUTPATIENT)
Dept: ADMINISTRATIVE | Facility: HOSPITAL | Age: 36
End: 2021-09-07

## 2021-09-07 DIAGNOSIS — Z11.52 ENCOUNTER FOR SCREENING FOR COVID-19: ICD-10-CM

## 2021-09-07 DIAGNOSIS — Z11.52 ENCOUNTER FOR SCREENING FOR COVID-19: Primary | ICD-10-CM

## 2021-09-07 LAB — SARS-COV-2 RNA NOSE QL NAA+PROBE: NOT DETECTED

## 2021-09-07 PROCEDURE — U0004 COV-19 TEST NON-CDC HGH THRU: HCPCS | Performed by: FAMILY MEDICINE

## 2021-09-07 PROCEDURE — C9803 HOPD COVID-19 SPEC COLLECT: HCPCS

## 2022-11-07 ENCOUNTER — LAB (OUTPATIENT)
Dept: LAB | Facility: HOSPITAL | Age: 37
End: 2022-11-07

## 2022-11-07 ENCOUNTER — TRANSCRIBE ORDERS (OUTPATIENT)
Dept: ADMINISTRATIVE | Facility: HOSPITAL | Age: 37
End: 2022-11-07

## 2022-11-07 DIAGNOSIS — R19.7 DIARRHEA, UNSPECIFIED TYPE: ICD-10-CM

## 2022-11-07 DIAGNOSIS — Z87.81 PERSONAL HISTORY OF (HEALED) TRAUMATIC FRACTURE: ICD-10-CM

## 2022-11-07 DIAGNOSIS — M06.4 INFLAMMATORY POLYARTHROPATHY: Primary | ICD-10-CM

## 2022-11-07 DIAGNOSIS — M06.4 INFLAMMATORY POLYARTHROPATHY: ICD-10-CM

## 2022-11-07 DIAGNOSIS — M79.641 PAIN IN RIGHT HAND: ICD-10-CM

## 2022-11-07 PROCEDURE — 36415 COLL VENOUS BLD VENIPUNCTURE: CPT

## 2022-11-07 PROCEDURE — 86258 DGP ANTIBODY EACH IG CLASS: CPT

## 2022-11-07 PROCEDURE — 82784 ASSAY IGA/IGD/IGG/IGM EACH: CPT

## 2022-11-07 PROCEDURE — 80053 COMPREHEN METABOLIC PANEL: CPT

## 2022-11-07 PROCEDURE — 80074 ACUTE HEPATITIS PANEL: CPT

## 2022-11-07 PROCEDURE — 85652 RBC SED RATE AUTOMATED: CPT

## 2022-11-07 PROCEDURE — 83516 IMMUNOASSAY NONANTIBODY: CPT

## 2022-11-07 PROCEDURE — 82306 VITAMIN D 25 HYDROXY: CPT

## 2022-11-07 PROCEDURE — 86036 ANCA SCREEN EACH ANTIBODY: CPT

## 2022-11-07 PROCEDURE — 85025 COMPLETE CBC W/AUTO DIFF WBC: CPT

## 2022-11-07 PROCEDURE — 84207 ASSAY OF VITAMIN B-6: CPT

## 2022-11-07 PROCEDURE — 86671 FUNGUS NES ANTIBODY: CPT

## 2022-11-07 PROCEDURE — 86140 C-REACTIVE PROTEIN: CPT

## 2022-11-07 PROCEDURE — 86364 TISS TRNSGLTMNASE EA IG CLAS: CPT

## 2022-11-07 PROCEDURE — 82607 VITAMIN B-12: CPT

## 2022-11-08 LAB
25(OH)D3 SERPL-MCNC: 31 NG/ML (ref 30–100)
ALBUMIN SERPL-MCNC: 4.4 G/DL (ref 3.5–5.2)
ALBUMIN/GLOB SERPL: 1.4 G/DL
ALP SERPL-CCNC: 78 U/L (ref 39–117)
ALT SERPL W P-5'-P-CCNC: 23 U/L (ref 1–33)
ANION GAP SERPL CALCULATED.3IONS-SCNC: 10.7 MMOL/L (ref 5–15)
AST SERPL-CCNC: 33 U/L (ref 1–32)
BASOPHILS # BLD AUTO: 0.08 10*3/MM3 (ref 0–0.2)
BASOPHILS NFR BLD AUTO: 0.9 % (ref 0–1.5)
BILIRUB SERPL-MCNC: 0.3 MG/DL (ref 0–1.2)
BUN SERPL-MCNC: 11 MG/DL (ref 6–20)
BUN/CREAT SERPL: 16.4 (ref 7–25)
CALCIUM SPEC-SCNC: 9 MG/DL (ref 8.6–10.5)
CHLORIDE SERPL-SCNC: 103 MMOL/L (ref 98–107)
CO2 SERPL-SCNC: 24.3 MMOL/L (ref 22–29)
CREAT SERPL-MCNC: 0.67 MG/DL (ref 0.57–1)
CRP SERPL-MCNC: 0.93 MG/DL (ref 0–0.5)
DEPRECATED RDW RBC AUTO: 40.7 FL (ref 37–54)
EGFRCR SERPLBLD CKD-EPI 2021: 114.9 ML/MIN/1.73
EOSINOPHIL # BLD AUTO: 0.08 10*3/MM3 (ref 0–0.4)
EOSINOPHIL NFR BLD AUTO: 0.9 % (ref 0.3–6.2)
ERYTHROCYTE [DISTWIDTH] IN BLOOD BY AUTOMATED COUNT: 12.1 % (ref 12.3–15.4)
ERYTHROCYTE [SEDIMENTATION RATE] IN BLOOD: 15 MM/HR (ref 0–20)
GLIADIN PEPTIDE IGA SER-ACNC: 5 UNITS (ref 0–19)
GLOBULIN UR ELPH-MCNC: 3.2 GM/DL
GLUCOSE SERPL-MCNC: 77 MG/DL (ref 65–99)
HAV IGM SERPL QL IA: NORMAL
HBV CORE IGM SERPL QL IA: NORMAL
HBV SURFACE AG SERPL QL IA: NORMAL
HCT VFR BLD AUTO: 40.9 % (ref 34–46.6)
HCV AB SER DONR QL: NORMAL
HGB BLD-MCNC: 14.3 G/DL (ref 12–15.9)
IGA SERPL-MCNC: 220 MG/DL (ref 87–352)
IMM GRANULOCYTES # BLD AUTO: 0.04 10*3/MM3 (ref 0–0.05)
IMM GRANULOCYTES NFR BLD AUTO: 0.5 % (ref 0–0.5)
LYMPHOCYTES # BLD AUTO: 1.63 10*3/MM3 (ref 0.7–3.1)
LYMPHOCYTES NFR BLD AUTO: 18.7 % (ref 19.6–45.3)
MCH RBC QN AUTO: 32.2 PG (ref 26.6–33)
MCHC RBC AUTO-ENTMCNC: 35 G/DL (ref 31.5–35.7)
MCV RBC AUTO: 92.1 FL (ref 79–97)
MONOCYTES # BLD AUTO: 0.57 10*3/MM3 (ref 0.1–0.9)
MONOCYTES NFR BLD AUTO: 6.5 % (ref 5–12)
NEUTROPHILS NFR BLD AUTO: 6.33 10*3/MM3 (ref 1.7–7)
NEUTROPHILS NFR BLD AUTO: 72.5 % (ref 42.7–76)
NRBC BLD AUTO-RTO: 0 /100 WBC (ref 0–0.2)
PLATELET # BLD AUTO: 281 10*3/MM3 (ref 140–450)
PMV BLD AUTO: 10.1 FL (ref 6–12)
POTASSIUM SERPL-SCNC: 4 MMOL/L (ref 3.5–5.2)
PROT SERPL-MCNC: 7.6 G/DL (ref 6–8.5)
RBC # BLD AUTO: 4.44 10*6/MM3 (ref 3.77–5.28)
SODIUM SERPL-SCNC: 138 MMOL/L (ref 136–145)
TTG IGA SER-ACNC: <2 U/ML (ref 0–3)
VIT B12 BLD-MCNC: 926 PG/ML (ref 211–946)
WBC NRBC COR # BLD: 8.73 10*3/MM3 (ref 3.4–10.8)

## 2022-11-09 LAB
BAKER'S YEAST IGG QN IA: 66 UNITS (ref 0–50)
CHITOBIOSIDE IGA SERPL IA-ACNC: 95 UNITS (ref 0–90)
LABORATORY COMMENT REPORT: ABNORMAL
LAMINARIBIOSIDE IGG SERPL IA-ACNC: 91 UNITS (ref 0–60)
MANNOBIOSIDE IGG SERPL IA-ACNC: 325 UNITS (ref 0–100)
P-ANCA ATYPICAL SER QL IF: NEGATIVE
PYRIDOXAL PHOS SERPL-MCNC: 17.9 UG/L (ref 3.4–65.2)

## 2025-06-25 ENCOUNTER — APPOINTMENT (OUTPATIENT)
Dept: GENERAL RADIOLOGY | Facility: HOSPITAL | Age: 40
End: 2025-06-25
Payer: COMMERCIAL

## 2025-06-25 ENCOUNTER — HOSPITAL ENCOUNTER (EMERGENCY)
Facility: HOSPITAL | Age: 40
Discharge: HOME | End: 2025-06-25
Attending: STUDENT IN AN ORGANIZED HEALTH CARE EDUCATION/TRAINING PROGRAM | Admitting: STUDENT IN AN ORGANIZED HEALTH CARE EDUCATION/TRAINING PROGRAM
Payer: COMMERCIAL

## 2025-06-25 VITALS
BODY MASS INDEX: 25.23 KG/M2 | SYSTOLIC BLOOD PRESSURE: 118 MMHG | HEART RATE: 86 BPM | DIASTOLIC BLOOD PRESSURE: 77 MMHG | HEIGHT: 66 IN | TEMPERATURE: 98.4 F | RESPIRATION RATE: 16 BRPM | WEIGHT: 157 LBS | OXYGEN SATURATION: 100 %

## 2025-06-25 DIAGNOSIS — S93.401A MODERATE RIGHT ANKLE SPRAIN, INITIAL ENCOUNTER: Primary | ICD-10-CM

## 2025-06-25 PROCEDURE — 73590 X-RAY EXAM OF LOWER LEG: CPT | Performed by: RADIOLOGY

## 2025-06-25 PROCEDURE — 73610 X-RAY EXAM OF ANKLE: CPT

## 2025-06-25 PROCEDURE — 99283 EMERGENCY DEPT VISIT LOW MDM: CPT

## 2025-06-25 PROCEDURE — 73610 X-RAY EXAM OF ANKLE: CPT | Performed by: RADIOLOGY

## 2025-06-25 PROCEDURE — 73590 X-RAY EXAM OF LOWER LEG: CPT

## 2025-06-25 PROCEDURE — 25010000002 KETOROLAC TROMETHAMINE PER 15 MG: Performed by: STUDENT IN AN ORGANIZED HEALTH CARE EDUCATION/TRAINING PROGRAM

## 2025-06-25 PROCEDURE — 96372 THER/PROPH/DIAG INJ SC/IM: CPT

## 2025-06-25 RX ORDER — HYDROCODONE BITARTRATE AND ACETAMINOPHEN 10; 325 MG/1; MG/1
1 TABLET ORAL EVERY 8 HOURS PRN
Qty: 12 TABLET | Refills: 0 | Status: SHIPPED | OUTPATIENT
Start: 2025-06-25

## 2025-06-25 RX ORDER — KETOROLAC TROMETHAMINE 30 MG/ML
60 INJECTION, SOLUTION INTRAMUSCULAR; INTRAVENOUS ONCE
Status: COMPLETED | OUTPATIENT
Start: 2025-06-25 | End: 2025-06-25

## 2025-06-25 RX ORDER — HYDROCODONE BITARTRATE AND ACETAMINOPHEN 10; 325 MG/1; MG/1
1 TABLET ORAL ONCE
Refills: 0 | Status: DISCONTINUED | OUTPATIENT
Start: 2025-06-25 | End: 2025-06-25 | Stop reason: HOSPADM

## 2025-06-25 RX ORDER — ONDANSETRON 4 MG/1
4 TABLET, FILM COATED ORAL EVERY 8 HOURS PRN
Qty: 30 TABLET | Refills: 0 | Status: SHIPPED | OUTPATIENT
Start: 2025-06-25

## 2025-06-25 RX ADMIN — KETOROLAC TROMETHAMINE 60 MG: 60 INJECTION, SOLUTION INTRAMUSCULAR at 07:33

## 2025-06-25 NOTE — ED PROVIDER NOTES
Subjective   History of Present Illness  Patient is a 40-year-old female that presents to the ER for evaluation of right ankle pain after she states that she had locked herself out of the house and crawled through a window but landed on her ankle sideways.      Review of Systems    Past Medical History:   Diagnosis Date    IBS (irritable bowel syndrome)        No Known Allergies    Past Surgical History:   Procedure Laterality Date     SECTION      MOUTH SURGERY         Family History   Problem Relation Age of Onset    Thyroid disease Mother     Fibromyalgia Mother     Depression Mother        Social History     Socioeconomic History    Marital status:    Tobacco Use    Smoking status: Never    Smokeless tobacco: Never   Substance and Sexual Activity    Alcohol use: Yes    Drug use: Never    Sexual activity: Yes     Partners: Male     Birth control/protection: Pill           Objective   Physical Exam  Vitals and nursing note reviewed.   Constitutional:       General: She is not in acute distress.     Appearance: She is well-developed. She is not diaphoretic.   HENT:      Head: Normocephalic and atraumatic.      Right Ear: External ear normal.      Left Ear: External ear normal.      Nose: Nose normal.   Eyes:      Conjunctiva/sclera: Conjunctivae normal.      Pupils: Pupils are equal, round, and reactive to light.   Neck:      Vascular: No JVD.      Trachea: No tracheal deviation.   Cardiovascular:      Rate and Rhythm: Normal rate and regular rhythm.      Heart sounds: Normal heart sounds. No murmur heard.  Pulmonary:      Effort: Pulmonary effort is normal. No respiratory distress.      Breath sounds: Normal breath sounds. No wheezing.   Abdominal:      General: Bowel sounds are normal.      Palpations: Abdomen is soft.      Tenderness: There is no abdominal tenderness.   Musculoskeletal:         General: No deformity. Normal range of motion.      Cervical back: Normal range of motion and neck  supple.      Comments: Patient has lateral malleolar swelling and tenderness to touch but range of motion is preserved.  She remains neurovascularly intact.   Skin:     General: Skin is warm and dry.      Coloration: Skin is not pale.      Findings: No erythema or rash.   Neurological:      Mental Status: She is alert and oriented to person, place, and time.      Cranial Nerves: No cranial nerve deficit.   Psychiatric:         Behavior: Behavior normal.         Thought Content: Thought content normal.         Procedures       XR Ankle 3+ View Right   Final Result       Intact right ankle with no acute fracture or dislocation.   Intact right tibia and fibula with no acute fracture or dislocation.   No tibiotalar joint effusion.   No increased fluid in the right knee joint.   No soft tissue swelling.   No foreign body.       This report was finalized on 6/25/2025 7:06 AM by Moe Castro MD.          XR Tibia Fibula 2 View Right   Final Result       Intact right ankle with no acute fracture or dislocation.   Intact right tibia and fibula with no acute fracture or dislocation.   No tibiotalar joint effusion.   No increased fluid in the right knee joint.   No soft tissue swelling.   No foreign body.       This report was finalized on 6/25/2025 7:06 AM by Moe Castro MD.                ED Course  ED Course as of 06/25/25 1931 Wed Jun 25, 2025   0658 Have been performed.  X-Strays are negative.  Patient has evidence of ankle sprain-    Motrin and Tylenol has not provided pain relief to the patient.    Pt will be placed in an Ace wrap, a walking boot and to follow-up with Ortho [LK]      ED Course User Index  [LK] Grace Solomon DO                                                       Medical Decision Making  Problems Addressed:  Moderate right ankle sprain, initial encounter: complicated acute illness or injury    Amount and/or Complexity of Data Reviewed  Radiology: ordered.    Risk  Prescription drug  management.        Final diagnoses:   Moderate right ankle sprain, initial encounter       ED Disposition  ED Disposition       ED Disposition   Discharge    Condition   Stable    Comment   --               L[facility]pez Barrett,   [address] [facility] [id number] [phone]               Medication List        New Prescriptions      HYDROcodone-acetaminophen  MG per tablet  Commonly known as: NORCO  Take 1 tablet by mouth Every 8 (Eight) Hours As Needed for Moderate Pain.     ondansetron 4 MG tablet  Commonly known as: ZOFRAN  Take 1 tablet by mouth Every 8 (Eight) Hours As Needed for Nausea or Vomiting.               Where to Get Your Medications        These medications were sent to [id number] - [place], [place] - [address]  [facility] [address] [facility] [address] - [phone]  - [phone]   [address] [place] [address]      Phone: [phone]   HYDROcodone-acetaminophen  MG per tablet  ondansetron 4 MG tablet            Grace Solomon DO  06/25/25 2671